# Patient Record
Sex: MALE | Race: WHITE | Employment: OTHER | URBAN - METROPOLITAN AREA
[De-identification: names, ages, dates, MRNs, and addresses within clinical notes are randomized per-mention and may not be internally consistent; named-entity substitution may affect disease eponyms.]

---

## 2017-05-28 ENCOUNTER — APPOINTMENT (OUTPATIENT)
Dept: CT IMAGING | Age: 47
End: 2017-05-28
Attending: EMERGENCY MEDICINE
Payer: MEDICAID

## 2017-05-28 ENCOUNTER — HOSPITAL ENCOUNTER (EMERGENCY)
Age: 47
Discharge: HOME | End: 2017-05-28
Attending: EMERGENCY MEDICINE
Payer: MEDICAID

## 2017-05-28 VITALS
RESPIRATION RATE: 16 BRPM | TEMPERATURE: 98 F | HEART RATE: 70 BPM | HEIGHT: 62 IN | WEIGHT: 190 LBS | DIASTOLIC BLOOD PRESSURE: 68 MMHG | SYSTOLIC BLOOD PRESSURE: 105 MMHG | OXYGEN SATURATION: 99 % | BODY MASS INDEX: 34.96 KG/M2

## 2017-05-28 DIAGNOSIS — R10.31 RIGHT GROIN PAIN: Primary | ICD-10-CM

## 2017-05-28 PROCEDURE — 74176 CT ABD & PELVIS W/O CONTRAST: CPT | Performed by: EMERGENCY MEDICINE

## 2017-05-28 PROCEDURE — 99284 EMERGENCY DEPT VISIT MOD MDM: CPT

## 2017-05-28 PROCEDURE — 81003 URINALYSIS AUTO W/O SCOPE: CPT | Performed by: EMERGENCY MEDICINE

## 2017-05-28 RX ORDER — KETOROLAC TROMETHAMINE 30 MG/ML
30 INJECTION, SOLUTION INTRAMUSCULAR; INTRAVENOUS ONCE
Status: DISCONTINUED | OUTPATIENT
Start: 2017-05-28 | End: 2017-05-28

## 2017-05-28 RX ORDER — ONDANSETRON 2 MG/ML
4 INJECTION INTRAMUSCULAR; INTRAVENOUS ONCE
Status: DISCONTINUED | OUTPATIENT
Start: 2017-05-28 | End: 2017-05-28

## 2017-05-28 RX ORDER — TRAMADOL HYDROCHLORIDE 50 MG/1
50 TABLET ORAL EVERY 6 HOURS PRN
Qty: 20 TABLET | Refills: 0 | Status: SHIPPED | OUTPATIENT
Start: 2017-05-28 | End: 2017-06-04

## 2017-05-28 RX ORDER — PREDNISONE 1 MG/1
5 TABLET ORAL DAILY
COMMUNITY
End: 2021-12-21

## 2017-05-28 RX ORDER — IBUPROFEN 600 MG/1
600 TABLET ORAL ONCE
Status: COMPLETED | OUTPATIENT
Start: 2017-05-28 | End: 2017-05-28

## 2017-05-28 RX ORDER — HYDROCODONE BITARTRATE AND ACETAMINOPHEN 5; 325 MG/1; MG/1
1 TABLET ORAL EVERY 6 HOURS PRN
COMMUNITY
End: 2020-06-07

## 2017-05-29 NOTE — ED NOTES
Pt's respirations were not 98 upon arrival for triage. Pt had 18 respirations per minute, regular, non-labored.

## 2017-05-29 NOTE — ED PROVIDER NOTES
Patient Seen in: [facility] In [place]    History   Patient presents with:  Abdomen/Flank Pain (GI/)    Stated Complaint: ABD PAIN    HPI    Patient is a 70-year-old male comes in emergency room for evaluation of right lower back pa HPI.    Physical Exam       ED Triage Vitals   BP 05/28/17 2053 142/96 mmHg   Pulse 05/28/17 2053 88   Resp 05/28/17 2053 98   Temp 05/28/17 2239 98 °F (36.7 °C)   Temp src 05/28/17 2053 Temporal   SpO2 05/28/17 2053 96 %   O2 Device 05/28/17 2053 None (Ro abdominal pain  TECHNIQUE:  Unenhanced multislice CT scanning from above the kidneys to below the urinary bladder. 2D rendering are generated on the CT scanner workstation to localize potential stones in the cranio-caudal plane.   Dose reduction techniques male comes in emergency room for evaluation of right groin pain. Patient has no acute findings on CT. Likely musculoskeletal.  No evidence for vascular cause. Will be given Ultram at home. Patient was screened and evaluated during this visit.    As a

## 2017-05-29 NOTE — ED INITIAL ASSESSMENT (HPI)
Pt c/o right flank pain that radiates to right groin. Denies nausea, vomiting, or fever. Denies dysuria, hematuria.

## 2020-06-07 ENCOUNTER — HOSPITAL ENCOUNTER (EMERGENCY)
Age: 50
Discharge: HOME | End: 2020-06-07
Attending: EMERGENCY MEDICINE
Payer: MEDICAID

## 2020-06-07 ENCOUNTER — APPOINTMENT (OUTPATIENT)
Dept: CT IMAGING | Age: 50
End: 2020-06-07
Attending: PHYSICIAN ASSISTANT
Payer: MEDICAID

## 2020-06-07 ENCOUNTER — APPOINTMENT (OUTPATIENT)
Dept: GENERAL RADIOLOGY | Age: 50
End: 2020-06-07
Attending: PHYSICIAN ASSISTANT
Payer: MEDICAID

## 2020-06-07 VITALS
RESPIRATION RATE: 18 BRPM | HEIGHT: 63 IN | OXYGEN SATURATION: 98 % | BODY MASS INDEX: 37.21 KG/M2 | HEART RATE: 74 BPM | WEIGHT: 210 LBS | TEMPERATURE: 98 F | SYSTOLIC BLOOD PRESSURE: 143 MMHG | DIASTOLIC BLOOD PRESSURE: 82 MMHG

## 2020-06-07 DIAGNOSIS — M54.50 LUMBAR PAIN WITH RADIATION DOWN LEFT LEG: ICD-10-CM

## 2020-06-07 DIAGNOSIS — M79.605 LUMBAR PAIN WITH RADIATION DOWN LEFT LEG: ICD-10-CM

## 2020-06-07 DIAGNOSIS — R16.1 SPLENOMEGALY: ICD-10-CM

## 2020-06-07 DIAGNOSIS — S29.011A CHEST WALL MUSCLE STRAIN, INITIAL ENCOUNTER: Primary | ICD-10-CM

## 2020-06-07 PROCEDURE — 85025 COMPLETE CBC W/AUTO DIFF WBC: CPT | Performed by: PHYSICIAN ASSISTANT

## 2020-06-07 PROCEDURE — 83690 ASSAY OF LIPASE: CPT | Performed by: PHYSICIAN ASSISTANT

## 2020-06-07 PROCEDURE — 96375 TX/PRO/DX INJ NEW DRUG ADDON: CPT

## 2020-06-07 PROCEDURE — 71101 X-RAY EXAM UNILAT RIBS/CHEST: CPT | Performed by: PHYSICIAN ASSISTANT

## 2020-06-07 PROCEDURE — 74177 CT ABD & PELVIS W/CONTRAST: CPT | Performed by: PHYSICIAN ASSISTANT

## 2020-06-07 PROCEDURE — 81001 URINALYSIS AUTO W/SCOPE: CPT | Performed by: PHYSICIAN ASSISTANT

## 2020-06-07 PROCEDURE — 80053 COMPREHEN METABOLIC PANEL: CPT | Performed by: PHYSICIAN ASSISTANT

## 2020-06-07 PROCEDURE — 81015 MICROSCOPIC EXAM OF URINE: CPT | Performed by: PHYSICIAN ASSISTANT

## 2020-06-07 PROCEDURE — 96374 THER/PROPH/DIAG INJ IV PUSH: CPT

## 2020-06-07 PROCEDURE — 99284 EMERGENCY DEPT VISIT MOD MDM: CPT

## 2020-06-07 RX ORDER — KETOROLAC TROMETHAMINE 30 MG/ML
15 INJECTION, SOLUTION INTRAMUSCULAR; INTRAVENOUS ONCE
Status: COMPLETED | OUTPATIENT
Start: 2020-06-07 | End: 2020-06-07

## 2020-06-07 RX ORDER — DEXAMETHASONE SODIUM PHOSPHATE 4 MG/ML
10 VIAL (ML) INJECTION ONCE
Status: COMPLETED | OUTPATIENT
Start: 2020-06-07 | End: 2020-06-07

## 2020-06-07 RX ORDER — METHYLPREDNISOLONE 4 MG/1
TABLET ORAL
Qty: 1 PACKAGE | Refills: 0 | Status: SHIPPED | OUTPATIENT
Start: 2020-06-08 | End: 2021-12-21

## 2020-06-07 RX ORDER — CYCLOBENZAPRINE HCL 10 MG
10 TABLET ORAL 3 TIMES DAILY PRN
Qty: 20 TABLET | Refills: 0 | Status: SHIPPED | OUTPATIENT
Start: 2020-06-07 | End: 2020-06-14

## 2020-06-07 NOTE — ED PROVIDER NOTES
Patient Seen in: THE [facility] Department In [place]      History   Patient presents with:  Back Pain  Abdomen/Flank Pain    Stated Complaint: patient presented to the ED with lower back pain.      NHUNG Lehman is a 25-year-old male who comes in wit Current:/82   Pulse 88   Temp 98.4 °F (36.9 °C) (Temporal)   Resp 18   Ht 160 cm (5' 3\")   Wt 95.3 kg   SpO2 98%   BMI 37.20 kg/m²         Physical Exam     General Appearance:  Alert, cooperative, no distress, appropriate for age  Head:  Norm ---------                               -----------         ------                     CBC W/ DIFFERENTIAL[[id number]]                              Final result                 Please view results for these tests on the individual orders.    CBC W/ DIFFERE Degenerative changes in the spine and hips. OTHER:  None. CONCLUSION:   1. No acute intra-abdominal/pelvic process identified. 2. Diffuse fatty infiltration of the liver. 3. Splenomegaly. Please see above for further details.     Dictated by: Kaylee Alcala care with the patient, who expresses understanding.  All questions and concerns are addressed to the patient's satisfaction prior to discharge today.       Disposition and Plan     Clinical Impression:  Chest wall muscle strain, initial encounter  (primary

## 2021-12-21 ENCOUNTER — APPOINTMENT (OUTPATIENT)
Dept: GENERAL RADIOLOGY | Age: 51
End: 2021-12-21
Attending: PHYSICIAN ASSISTANT
Payer: MEDICAID

## 2021-12-21 ENCOUNTER — HOSPITAL ENCOUNTER (EMERGENCY)
Age: 51
Discharge: HOME | End: 2021-12-21
Attending: EMERGENCY MEDICINE
Payer: MEDICAID

## 2021-12-21 VITALS
HEART RATE: 91 BPM | SYSTOLIC BLOOD PRESSURE: 136 MMHG | RESPIRATION RATE: 20 BRPM | WEIGHT: 210 LBS | OXYGEN SATURATION: 99 % | TEMPERATURE: 97 F | HEIGHT: 62 IN | DIASTOLIC BLOOD PRESSURE: 88 MMHG | BODY MASS INDEX: 38.64 KG/M2

## 2021-12-21 DIAGNOSIS — R42 DIZZINESS: ICD-10-CM

## 2021-12-21 DIAGNOSIS — S46.811A TRAPEZIUS STRAIN, RIGHT, INITIAL ENCOUNTER: ICD-10-CM

## 2021-12-21 DIAGNOSIS — M47.22 CERVICAL RADICULOPATHY DUE TO DEGENERATIVE JOINT DISEASE OF SPINE: Primary | ICD-10-CM

## 2021-12-21 PROCEDURE — 99283 EMERGENCY DEPT VISIT LOW MDM: CPT

## 2021-12-21 PROCEDURE — 72050 X-RAY EXAM NECK SPINE 4/5VWS: CPT | Performed by: PHYSICIAN ASSISTANT

## 2021-12-21 PROCEDURE — 99284 EMERGENCY DEPT VISIT MOD MDM: CPT

## 2021-12-21 RX ORDER — IBUPROFEN 800 MG/1
800 TABLET ORAL ONCE
Status: COMPLETED | OUTPATIENT
Start: 2021-12-21 | End: 2021-12-21
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
## (undated) NOTE — ED AVS SNAPSHOT
[facility] Emergency Department in [address] [facility]    Phone:  [phone]    Fax:  [phone]           Sarah Sánchez   MRN: [id number]    Department:  [facility] Department in [place]   Date of Visit: To Check ER Wait Times:  TEXT 'ERwait' to 38864      Click [email or web address]      Or call [phone]    If you have any problems with your follow-up, please call our  at [phone]    Si usted tiene algun problema con espinosa sequimiento, por f I have read and understand the instructions given to me by my caregivers. [facility]        Pharmacy Address Phone Number   [facility] [phone] ([address]) [place] 1. The study is limited due to lack of IV and oral contrast.  2. No acute intra-abdominal or pelvic process.  Incidental note is made of moderate distention of the stomach by fluid and food material.           Dictated by: Tiera Lester MD on 5/28/2017 normal in size. There is mild distention of the bladder. No bladder wall thickening noted. LUNG BASES:  Small calcified granuloma right middle lobe. No pneumonia. OTHER:  Negative.                MyChart     Sign up for [employer], your secure [employer]

## (undated) NOTE — ED AVS SNAPSHOT
[facility] in [address] [place] [address]    Phone:  [phone]    Fax:  [phone]           Jacquelin Richard   MRN: [id number]    Department:  [facility] in [place]   Date of Visit: IF THERE IS ANY CHANGE OR WORSENING OF YOUR CONDITION, CALL YOUR PRIMARY CARE PHYSICIAN AT ONCE OR RETURN IMMEDIATELY TO THE EMERGENCY DEPARTMENT.     If you have been prescribed any medication(s), please fill your prescription right away and begin taking t